# Patient Record
Sex: MALE | Race: WHITE | NOT HISPANIC OR LATINO | Employment: OTHER | RURAL
[De-identification: names, ages, dates, MRNs, and addresses within clinical notes are randomized per-mention and may not be internally consistent; named-entity substitution may affect disease eponyms.]

---

## 2023-03-07 ENCOUNTER — OFFICE VISIT (OUTPATIENT)
Dept: OTOLARYNGOLOGY | Facility: CLINIC | Age: 43
End: 2023-03-07
Payer: OTHER GOVERNMENT

## 2023-03-07 ENCOUNTER — CLINICAL SUPPORT (OUTPATIENT)
Dept: AUDIOLOGY | Facility: CLINIC | Age: 43
End: 2023-03-07
Payer: OTHER GOVERNMENT

## 2023-03-07 VITALS — WEIGHT: 155 LBS | BODY MASS INDEX: 22.96 KG/M2 | HEIGHT: 69 IN

## 2023-03-07 DIAGNOSIS — H93.19 TINNITUS, UNSPECIFIED LATERALITY: Primary | ICD-10-CM

## 2023-03-07 DIAGNOSIS — H93.19 SUBJECTIVE TINNITUS, UNSPECIFIED LATERALITY: Primary | ICD-10-CM

## 2023-03-07 DIAGNOSIS — H69.93 DYSFUNCTION OF BOTH EUSTACHIAN TUBES: ICD-10-CM

## 2023-03-07 DIAGNOSIS — H93.299 ABNORMAL AUDITORY PERCEPTION, UNSPECIFIED LATERALITY: ICD-10-CM

## 2023-03-07 PROCEDURE — 99204 PR OFFICE/OUTPT VISIT, NEW, LEVL IV, 45-59 MIN: ICD-10-PCS | Mod: S$PBB,,, | Performed by: OTOLARYNGOLOGY

## 2023-03-07 PROCEDURE — 99212 OFFICE O/P EST SF 10 MIN: CPT | Mod: PBBFAC | Performed by: AUDIOLOGIST

## 2023-03-07 PROCEDURE — 92552 PURE TONE AUDIOMETRY AIR: CPT | Mod: PBBFAC | Performed by: AUDIOLOGIST

## 2023-03-07 PROCEDURE — 92555 SPEECH THRESHOLD AUDIOMETRY: CPT | Mod: PBBFAC | Performed by: AUDIOLOGIST

## 2023-03-07 PROCEDURE — 99204 OFFICE O/P NEW MOD 45 MIN: CPT | Mod: S$PBB,,, | Performed by: OTOLARYNGOLOGY

## 2023-03-07 PROCEDURE — 99213 OFFICE O/P EST LOW 20 MIN: CPT | Mod: PBBFAC | Performed by: OTOLARYNGOLOGY

## 2023-03-07 NOTE — PROGRESS NOTES
Subjective:       Patient ID: Issa Camacho is a 43 y.o. male.    Chief Complaint: Tinnitus (Bilateral ear ringing. Hearing test done. ) and Hearing Loss    Ringing in Ears:    Associated symptoms: Tinnitus.    Hearing Loss:    Associated symptoms: Tinnitus.    Review of Systems   HENT:  Positive for hearing loss and tinnitus.    All other systems reviewed and are negative.    Objective:      Physical Exam  General: NAD  Head: Normocephalic, atraumatic, no facial asymmetry/normal strength,  Ears: Both auricules normal in appearance, w/o deformities tympanic membranes normal external auditory canals normal  Nose: External nose w/o deformities normal turbinates no drainage or inflammation  Oral Cavity: Lips, gums, floor of mouth, tongue hard palate, and buccal mucosa without mass/lesion  Oropharynx: Mucosa pink and moist, soft palate, posterior pharynx and oropharyngeal wall without mass/lesion  Neck: Supple, symmetric, trachea midline, no palpable mass/lesion, no palpable cervical lymphadenopathy  Skin: Warm and dry, no concerning lesions  Respiratory: Respirations even, unlabored   Assessment:       1. Tinnitus, unspecified laterality    2. Dysfunction of both eustachian tubes        Plan:       Reviewed audio in detail   Normal hearing   Trial nasacort

## 2023-03-22 ENCOUNTER — TELEPHONE (OUTPATIENT)
Dept: SLEEP MEDICINE | Facility: CLINIC | Age: 43
End: 2023-03-22
Payer: OTHER GOVERNMENT

## 2023-03-23 ENCOUNTER — OFFICE VISIT (OUTPATIENT)
Dept: SLEEP MEDICINE | Facility: CLINIC | Age: 43
End: 2023-03-23
Payer: OTHER GOVERNMENT

## 2023-03-23 VITALS
WEIGHT: 152.19 LBS | OXYGEN SATURATION: 100 % | BODY MASS INDEX: 22.54 KG/M2 | HEIGHT: 69 IN | SYSTOLIC BLOOD PRESSURE: 114 MMHG | DIASTOLIC BLOOD PRESSURE: 70 MMHG | HEART RATE: 65 BPM

## 2023-03-23 DIAGNOSIS — G47.30 SLEEP APNEA, UNSPECIFIED TYPE: Primary | ICD-10-CM

## 2023-03-23 DIAGNOSIS — G47.8 OTHER SLEEP DISORDERS: ICD-10-CM

## 2023-03-23 DIAGNOSIS — G47.19 OTHER HYPERSOMNIA: ICD-10-CM

## 2023-03-23 DIAGNOSIS — I10 ESSENTIAL HYPERTENSION: ICD-10-CM

## 2023-03-23 PROCEDURE — 99203 OFFICE O/P NEW LOW 30 MIN: CPT | Mod: S$PBB,,, | Performed by: FAMILY MEDICINE

## 2023-03-23 PROCEDURE — 99203 PR OFFICE/OUTPT VISIT, NEW, LEVL III, 30-44 MIN: ICD-10-PCS | Mod: S$PBB,,, | Performed by: FAMILY MEDICINE

## 2023-03-23 PROCEDURE — 99213 OFFICE O/P EST LOW 20 MIN: CPT | Mod: PBBFAC,PN | Performed by: FAMILY MEDICINE

## 2023-03-23 RX ORDER — LISINOPRIL 10 MG/1
10 TABLET ORAL DAILY
COMMUNITY
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
## 2023-03-23 NOTE — PROGRESS NOTES
Subjective:       Patient ID: Issa Camacho is a 43 y.o. male.    Chief Complaint: Sleep Apnea, Snoring, and Fatigue    Patient seen in sleep clinic with a complaint of excessive daytime sleepiness, loud snoring and witnessed apneas.  Patient states he has been snoring for at least 10 years.  The patient is a  and is usually in bed around 10:30 p.m. and up for the day at 6:00 a.m. waking unrefreshed.  The patient will be up at least twice a night and sometimes has difficulty going back to sleep.  The patient states that he occasionally will have night sweats and has frequent bruxism issues and has had an episode of drowsy driving.    Review of Systems   Constitutional:  Positive for fatigue.   Respiratory:  Positive for apnea. Negative for shortness of breath and wheezing.    Neurological:  Negative for headaches and memory loss.   Psychiatric/Behavioral:  Positive for sleep disturbance.  Denies restless legs, cataplexy, sleep paralysis, hypnagogic or hypnopompic hallucinations.  Admits to drowsy driving, night sweats, bruxism, issues with initiating and maintaining sleep.      Objective:      Physical Exam  Vitals reviewed.   Constitutional:       General: He is not in acute distress.  HENT:      Head: Normocephalic.      Comments: Mallampati Class III  No Macroglossia, No Micrognathia, and No Retrognathia observed.        Nose: Nose normal. No septal deviation.      Mouth/Throat:      Tongue: No lesions. Tongue does not deviate from midline.      Pharynx: Uvula midline.   Neck:      Thyroid: No thyroid mass, thyromegaly or thyroid tenderness.      Vascular: No carotid bruit.      Comments: Thyroid midline without masses or enlargement.  Cardiovascular:      Rate and Rhythm: Normal rate and regular rhythm.      Heart sounds: Normal heart sounds. No murmur heard.    No friction rub. No gallop.   Pulmonary:      Effort: Pulmonary effort is normal.      Breath sounds: Normal breath sounds.    Musculoskeletal:      Cervical back: Neck supple.      Right lower leg: No edema.      Left lower leg: No edema.   Lymphadenopathy:      Cervical:      Right cervical: No superficial or posterior cervical adenopathy.     Left cervical: No superficial or posterior cervical adenopathy.   Skin:     General: Skin is warm and dry.   Neurological:      Mental Status: He is alert and oriented to person, place, and time.   Psychiatric:         Attention and Perception: Attention normal.         Mood and Affect: Mood and affect normal.         Speech: Speech normal.         Behavior: Behavior normal. Behavior is cooperative.       Assessment:       Problem List Items Addressed This Visit          Cardiac/Vascular    Essential hypertension     Other Visit Diagnoses       Sleep apnea, unspecified type    -  Primary    Other hypersomnia        Other sleep disorders                  Plan:       1. Polysomnography  2. Caution while operating machinery especially avoid driving when drowsy.  3. Improve sleep hygiene

## 2023-05-30 ENCOUNTER — PROCEDURE VISIT (OUTPATIENT)
Dept: SLEEP MEDICINE | Facility: HOSPITAL | Age: 43
End: 2023-05-30
Payer: OTHER GOVERNMENT

## 2023-05-30 DIAGNOSIS — G47.19 OTHER HYPERSOMNIA: ICD-10-CM

## 2023-05-30 DIAGNOSIS — G47.30 SLEEP APNEA, UNSPECIFIED TYPE: ICD-10-CM

## 2023-05-30 DIAGNOSIS — G47.8 OTHER SLEEP DISORDERS: ICD-10-CM

## 2023-06-06 PROCEDURE — 95810 PR POLYSOMNOGRAPHY, 4 OR MORE: ICD-10-PCS | Mod: 26,,, | Performed by: FAMILY MEDICINE

## 2023-06-06 PROCEDURE — 95810 POLYSOM 6/> YRS 4/> PARAM: CPT | Mod: 26,,, | Performed by: FAMILY MEDICINE

## 2023-08-21 ENCOUNTER — TELEPHONE (OUTPATIENT)
Dept: SLEEP MEDICINE | Facility: CLINIC | Age: 43
End: 2023-08-21
Payer: OTHER GOVERNMENT

## 2023-08-22 ENCOUNTER — OFFICE VISIT (OUTPATIENT)
Dept: SLEEP MEDICINE | Facility: CLINIC | Age: 43
End: 2023-08-22
Attending: FAMILY MEDICINE
Payer: OTHER GOVERNMENT

## 2023-08-22 VITALS
OXYGEN SATURATION: 100 % | SYSTOLIC BLOOD PRESSURE: 134 MMHG | HEIGHT: 69 IN | BODY MASS INDEX: 22.93 KG/M2 | HEART RATE: 63 BPM | WEIGHT: 154.81 LBS | DIASTOLIC BLOOD PRESSURE: 73 MMHG

## 2023-08-22 DIAGNOSIS — I10 ESSENTIAL HYPERTENSION: ICD-10-CM

## 2023-08-22 DIAGNOSIS — F51.19: Primary | ICD-10-CM

## 2023-08-22 PROCEDURE — 99213 OFFICE O/P EST LOW 20 MIN: CPT | Mod: PBBFAC | Performed by: FAMILY MEDICINE

## 2023-08-22 PROCEDURE — 99213 OFFICE O/P EST LOW 20 MIN: CPT | Mod: S$PBB,,, | Performed by: FAMILY MEDICINE

## 2023-08-22 PROCEDURE — 99213 PR OFFICE/OUTPT VISIT, EST, LEVL III, 20-29 MIN: ICD-10-PCS | Mod: S$PBB,,, | Performed by: FAMILY MEDICINE

## 2023-08-22 NOTE — PROGRESS NOTES
Subjective     Patient ID: Issa Camacho is a 43 y.o. male.    Chief Complaint: No chief complaint on file.    Patient follows up in sleep clinic to discuss recent polysomnography findings and review patient's 2 week sleep diary.  Patient has now retired from the  and he has noted improvement in his quality of sleep which is evident as his [place] score has dropped from 14-8.  I reviewed the patient's polysomnography which revealed an AHI of 3.5 which is not consistent with significant REYNA.  Patient's O2 saturations never dropped below 94% and there was no evidence of PLMS.  Sleep efficiency was excellent at 96%.  The patient did exhibit bradycardia with the lowest heart rate at 35 beats per minute but I think this is due to patient's excellent physical condition.  I discussed the 2 week sleep diary which revealed consuming alcohol prior to bedtime which we discussed at length and caffeine after lunch was another issue that we discussed.  The patient routine bedtimes are fairly consistent patient is having no difficulty falling asleep but is routinely waking around 4-5 a.m. in order to go to the bathroom.  I feel the patient's sleep consolidation would improve by decreasing fluid intake after his last meal of the day and avoiding the alcohol and caffeine consumption close to bedtime.      Review of Systems   Constitutional:  Negative for fatigue.        Negative EDS   Respiratory:  Negative for apnea.    Psychiatric/Behavioral:  Negative for sleep disturbance.           Objective     Physical Exam  Cardiovascular:      Rate and Rhythm: Normal rate and regular rhythm.      Heart sounds: No murmur heard.  Pulmonary:      Breath sounds: Normal breath sounds.   Skin:     General: Skin is warm and dry.   Neurological:      Mental Status: He is alert and oriented to person, place, and time.            Assessment and Plan     1. Behaviorally-induced hypersomnia    2. Essential hypertension      1. Stop alcoholic  consumption just prior to bedtime.    2. Avoid caffeine after lunch.  3. Follow-up with sleep clinic p.r.n.      Follow up if symptoms worsen or fail to improve.